# Patient Record
Sex: MALE | Race: WHITE | NOT HISPANIC OR LATINO
[De-identification: names, ages, dates, MRNs, and addresses within clinical notes are randomized per-mention and may not be internally consistent; named-entity substitution may affect disease eponyms.]

---

## 2022-04-06 ENCOUNTER — APPOINTMENT (OUTPATIENT)
Dept: ORTHOPEDIC SURGERY | Facility: CLINIC | Age: 67
End: 2022-04-06
Payer: MEDICARE

## 2022-04-06 VITALS — BODY MASS INDEX: 23.3 KG/M2 | WEIGHT: 145 LBS | HEIGHT: 66 IN

## 2022-04-06 PROBLEM — Z00.00 ENCOUNTER FOR PREVENTIVE HEALTH EXAMINATION: Status: ACTIVE | Noted: 2022-04-06

## 2022-04-06 PROCEDURE — J3490M: CUSTOM

## 2022-04-06 PROCEDURE — 99203 OFFICE O/P NEW LOW 30 MIN: CPT | Mod: 25

## 2022-04-06 PROCEDURE — 20611 DRAIN/INJ JOINT/BURSA W/US: CPT

## 2022-04-06 PROCEDURE — 73564 X-RAY EXAM KNEE 4 OR MORE: CPT | Mod: LT

## 2022-04-06 RX ORDER — AMLODIPINE BESYLATE 5 MG/1
5 TABLET ORAL
Refills: 0 | Status: ACTIVE | COMMUNITY

## 2022-04-06 RX ORDER — LOSARTAN POTASSIUM 100 MG/1
TABLET, FILM COATED ORAL
Refills: 0 | Status: ACTIVE | COMMUNITY

## 2022-04-06 RX ORDER — ATENOLOL 25 MG/1
25 TABLET ORAL
Refills: 0 | Status: ACTIVE | COMMUNITY

## 2022-04-06 NOTE — HISTORY OF PRESENT ILLNESS
[Gradual] : gradual [6] : 6 [2] : 2 [Sharp] : sharp [Throbbing] : throbbing [Meds] : meds [Left Leg] : left leg [de-identified] : 66 year old male with pain in the left knee, symptoms started a couple weeks ago, knee feels stiff, pain with stairs and kneeling. No mechanical symptoms. He denies any prior history of injury.  [] : no [FreeTextEntry1] : left knee [FreeTextEntry5] : pt has been having left knee pain for the past few weeks. pt feels pain with bending and squeezing of the knee.  [FreeTextEntry9] : naproxen [de-identified] : bending or walking

## 2022-04-06 NOTE — DISCUSSION/SUMMARY
[Medication Risks Reviewed] : Medication risks reviewed [de-identified] : Patient allowed to gently start resuming activities.\par Discussed change to medication prescription and usage. \par Offered cortisone steroid injection. \par Bracing options discussed with patient. \par Hyaluronic Acid inj pamphlet given to pt.\par

## 2022-04-06 NOTE — PHYSICAL EXAM
[5___] : hamstring 5[unfilled]/5 [Equivocal] : equivocal Reyna [Left] : left knee [AP] : anteroposterior [Lateral] : lateral [[place]] : skyline [AP Standing] : anteroposterior standing [Moderate tricompartmental OA medial narrowing] : Moderate tricompartmental OA medial narrowing [] : non-antalgic

## 2022-06-08 ENCOUNTER — APPOINTMENT (OUTPATIENT)
Dept: ORTHOPEDIC SURGERY | Facility: CLINIC | Age: 67
End: 2022-06-08
Payer: MEDICARE

## 2022-06-08 VITALS — BODY MASS INDEX: 22.98 KG/M2 | HEIGHT: 66 IN | WEIGHT: 143 LBS

## 2022-06-08 PROCEDURE — 99213 OFFICE O/P EST LOW 20 MIN: CPT | Mod: 25

## 2022-06-08 PROCEDURE — 20611 DRAIN/INJ JOINT/BURSA W/US: CPT

## 2022-06-08 NOTE — DISCUSSION/SUMMARY
[de-identified] : Patient allowed to gently start resuming activities.\par Discussed change to medication prescription and usage. \par Bracing options discussed with patient. \par Hyaluronic Acid inj pamphlet given to pt.\par Name of Insurance\par Insurance Address:\par \par 06/08/2022 \par \par  \par \par RE:  GINA HUERTAS \par \par Acct #- [id number] \par \par \par Attention:  Nurse Reviewer /Medical Director\par \par I am writing this letter as a medical necessity for PT program.\par Patient has tried analgesics, non-steroid anti-inflammatory agents, \par hot or cold compresses,injections of corticosteroids, etc)  which in combination or by themselves has not worked.\par Based on my patient's condition, I strongly believe that the PT is medically needed.\par  \par Thank you for your time and consideration.   \par  \par \par \par \par \par

## 2022-06-08 NOTE — HISTORY OF PRESENT ILLNESS
[8] : 8 [4] : 4 [de-identified] : pt is here today for a follow up of the left knee. pt states the cortisone injection helped for a little but the pain is now back  [FreeTextEntry1] : left knee  [de-identified] : none

## 2022-06-15 ENCOUNTER — APPOINTMENT (OUTPATIENT)
Dept: ORTHOPEDIC SURGERY | Facility: CLINIC | Age: 67
End: 2022-06-15
Payer: MEDICARE

## 2022-06-15 VITALS — BODY MASS INDEX: 22.98 KG/M2 | HEIGHT: 66 IN | WEIGHT: 143 LBS

## 2022-06-15 PROCEDURE — 99024 POSTOP FOLLOW-UP VISIT: CPT

## 2022-06-15 PROCEDURE — 20611 DRAIN/INJ JOINT/BURSA W/US: CPT

## 2022-06-15 NOTE — PROCEDURE
[FreeTextEntry3] : Orthovisc (Large Joint) with Ultrasound Guidance\par Viscosupplementation Injection: X-ray evidence of Osteoarthritis on this or prior visit and Patient has tried OTC's including aspirin, Ibuprofen, Aleve etc or prescription NSAIDS, and/or exercises at home and/ or physical therapy without satisfactory response. \par An injection of Orthovisc 2ml #__2__ was injected into the left knee(s). after verbal consent using sterile technique. The risks, benefits, and alternatives to Viscosupplementation injection were explained in full to the patient. Risks outlined include but are not limited to infection, sepsis, bleeding, scarring, skin discoloration, temporary increase in pain, syncopal episode, failure to resolve symptoms, allergic reaction, and symptom recurrence. Signs and symptoms of infection reviewed and patient advised to call immediately for redness, fevers, and/or chills. Patient understood the risks. All questions were answered. After discussion of options, patient requested Viscosupplementation. Oral informed consent was obtained and sterile prep was done of the injection site. Sterile technique was used without complications. The patient tolerated the procedure well. Ice tonight to the injection site. \par \par Ultrasound Guidance was used for the following reasons: altered anatomic landmarks because of erosive arthritis. \par \par Ultrasound guided injection was performed of the knee, visualization of the needle and placement of injection was performed without complication. \par

## 2022-06-15 NOTE — HISTORY OF PRESENT ILLNESS
[2] : 2 [Orthovisc] : Orthovisc [de-identified] : Patient returns for Orthovisc #2 left knee, tolerated the first injection well, no adverse reactions.  [] : no [de-identified] : 06/08/2022 [de-identified] : left knee

## 2022-06-22 ENCOUNTER — APPOINTMENT (OUTPATIENT)
Dept: ORTHOPEDIC SURGERY | Facility: CLINIC | Age: 67
End: 2022-06-22

## 2022-06-22 VITALS — WEIGHT: 143 LBS | BODY MASS INDEX: 22.98 KG/M2 | HEIGHT: 66 IN

## 2022-06-22 PROCEDURE — 99024 POSTOP FOLLOW-UP VISIT: CPT | Mod: NC

## 2022-06-22 PROCEDURE — 20611 DRAIN/INJ JOINT/BURSA W/US: CPT

## 2022-06-22 RX ORDER — ATENOLOL 100 MG/1
100 TABLET ORAL
Qty: 30 | Refills: 0 | Status: ACTIVE | COMMUNITY
Start: 2022-06-14

## 2022-06-22 RX ORDER — AMLODIPINE BESYLATE 10 MG/1
10 TABLET ORAL
Qty: 30 | Refills: 0 | Status: ACTIVE | COMMUNITY
Start: 2022-06-20

## 2022-06-22 RX ORDER — OLMESARTAN MEDOXOMIL AND HYDROCHLOROTHIAZIDE 40; 25 MG/1; MG/1
40-25 TABLET ORAL
Qty: 30 | Refills: 0 | Status: ACTIVE | COMMUNITY
Start: 2022-06-20

## 2022-06-22 RX ORDER — DOXAZOSIN 8 MG/1
8 TABLET ORAL
Qty: 30 | Refills: 0 | Status: ACTIVE | COMMUNITY
Start: 2022-06-14

## 2022-06-22 NOTE — HISTORY OF PRESENT ILLNESS
[3] : 3 [Orthovisc] : Orthovisc [de-identified] : Patient returns for Orthovisc #3 left knee, tolerating the series well  [] : no [de-identified] : 06/15/2022 [de-identified] : left knee

## 2022-06-22 NOTE — PROCEDURE
[FreeTextEntry3] : Orthovisc (Large Joint) with Ultrasound Guidance\par Viscosupplementation Injection: X-ray evidence of Osteoarthritis on this or prior visit and Patient has tried OTC's including aspirin, Ibuprofen, Aleve etc or prescription NSAIDS, and/or exercises at home and/ or physical therapy without satisfactory response. \par An injection of Orthovisc 2ml #__3__ was injected into the left knee(s). after verbal consent using sterile technique. The risks, benefits, and alternatives to Viscosupplementation injection were explained in full to the patient. Risks outlined include but are not limited to infection, sepsis, bleeding, scarring, skin discoloration, temporary increase in pain, syncopal episode, failure to resolve symptoms, allergic reaction, and symptom recurrence. Signs and symptoms of infection reviewed and patient advised to call immediately for redness, fevers, and/or chills. Patient understood the risks. All questions were answered. After discussion of options, patient requested Viscosupplementation. Oral informed consent was obtained and sterile prep was done of the injection site. Sterile technique was used without complications. The patient tolerated the procedure well. Ice tonight to the injection site. \par \par Ultrasound Guidance was used for the following reasons: altered anatomic landmarks because of erosive arthritis. \par \par Ultrasound guided injection was performed of the knee, visualization of the needle and placement of injection was performed without complication. \par

## 2022-06-29 ENCOUNTER — APPOINTMENT (OUTPATIENT)
Dept: ORTHOPEDIC SURGERY | Facility: CLINIC | Age: 67
End: 2022-06-29

## 2022-06-29 VITALS — HEIGHT: 66 IN | BODY MASS INDEX: 22.98 KG/M2 | WEIGHT: 143 LBS

## 2022-06-29 PROCEDURE — 99024 POSTOP FOLLOW-UP VISIT: CPT

## 2022-06-29 PROCEDURE — 20611 DRAIN/INJ JOINT/BURSA W/US: CPT | Mod: LT

## 2022-06-29 NOTE — PROCEDURE
[FreeTextEntry3] : Orthovisc (Large Joint) with Ultrasound Guidance\par Viscosupplementation Injection: X-ray evidence of Osteoarthritis on this or prior visit and Patient has tried OTC's including aspirin, Ibuprofen, Aleve etc or prescription NSAIDS, and/or exercises at home and/ or physical therapy without satisfactory response. \par An injection of Orthovisc 2ml #__4__ was injected into the left knee(s). after verbal consent using sterile technique. The risks, benefits, and alternatives to Viscosupplementation injection were explained in full to the patient. Risks outlined include but are not limited to infection, sepsis, bleeding, scarring, skin discoloration, temporary increase in pain, syncopal episode, failure to resolve symptoms, allergic reaction, and symptom recurrence. Signs and symptoms of infection reviewed and patient advised to call immediately for redness, fevers, and/or chills. Patient understood the risks. All questions were answered. After discussion of options, patient requested Viscosupplementation. Oral informed consent was obtained and sterile prep was done of the injection site. Sterile technique was used without complications. The patient tolerated the procedure well. Ice tonight to the injection site. \par \par Ultrasound Guidance was used for the following reasons: altered anatomic landmarks because of erosive arthritis. \par \par Ultrasound guided injection was performed of the knee, visualization of the needle and placement of injection was performed without complication. \par

## 2022-06-29 NOTE — HISTORY OF PRESENT ILLNESS
[Orthovisc] : Orthovisc [de-identified] : Patient returns for Orthovisc #4 left knee, tolerating the series well  [3] : 3 [] : no [de-identified] : 06/15/2022 [de-identified] : left knee

## 2023-08-14 ENCOUNTER — APPOINTMENT (OUTPATIENT)
Dept: ORTHOPEDIC SURGERY | Facility: CLINIC | Age: 68
End: 2023-08-14
Payer: MEDICARE

## 2023-08-14 VITALS — BODY MASS INDEX: 22.98 KG/M2 | WEIGHT: 143 LBS | HEIGHT: 66 IN

## 2023-08-14 PROCEDURE — 99213 OFFICE O/P EST LOW 20 MIN: CPT | Mod: 25

## 2023-08-14 PROCEDURE — J3490M: CUSTOM | Mod: NC

## 2023-08-14 PROCEDURE — 73564 X-RAY EXAM KNEE 4 OR MORE: CPT | Mod: LT

## 2023-08-14 PROCEDURE — 20611 DRAIN/INJ JOINT/BURSA W/US: CPT | Mod: LT

## 2023-08-14 NOTE — PROCEDURE
[FreeTextEntry3] : Large Joint Injection / Aspiration: Lidocaine, Zilretta Ultrasound and Guidance Lidocaine: 3 cc. Needle size: 18 gauge , 1.5 inch.  Zilretta: An Injection of Zilretta 32 Units 5ml ,  was injected into the left knee  Ultrasound Guidance was used  Ultrasound guided injection was performed of the knee, visualization of the needle and placement of injection was performed without complication.   Large Joint Injection was performed because of pain and inflammation. Anesthesia: ethyl chloride sprayed topically.. Patient has tried OTC's including aspirin, Ibuprofen, Aleve etc or prescription NSAIDS, and/or exercises at home and/ or physical therapy without satisfactory response. After verbal consent using sterile preparation and technique. The risks, benefits, and alternatives to aspiration were explained in full to the patient. Risks outlined include but are not limited to infection, sepsis, bleeding, scarring, skin discoloration, temporary increase in pain, syncopal episode, failure to resolve symptoms, allergic reaction and symptom recurrence. Patient understood the risks. All questions were answered. After discussion of options, patient requested an aspiration. Oral informed consent was obtained and sterile prep was done of the injection site. Sterile technique was utilized for the procedure including the preparation of the solutions used for the aspiration. Patient tolerated the procedure well. Advised to ice the injection site this evening. Prep with betadine locally to site. Sterile technique used. Patient tolerated procedure well. Post Procedure Instructions: Patient was advised to call if redness, pain, or fever occur and apply ice for 15 min. out of every hour for the next 12-24 hours as tolerated. patient was advised to rest the joint(s) for 1 days.

## 2023-08-14 NOTE — HISTORY OF PRESENT ILLNESS
[10] : 10 [4] : 4 [de-identified] : Patient completedr Orthovisc #4 June 29 left knee,did well, no injury, csi has helped before [3] : 3 [Orthovisc] : Orthovisc [] : yes [FreeTextEntry1] : left knee [de-identified] : 06/15/2022 [de-identified] : left knee

## 2023-08-14 NOTE — PHYSICAL EXAM
[5___] : quadriceps 5[unfilled]/5 [Negative] : negative Cookie's [] : mildly antalgic [Left] : left knee [All Views] : anteroposterior, lateral, skyline, and anteroposterior standing [Moderate tricompartmental OA medial narrowing] : Moderate tricompartmental OA medial narrowing [FreeTextEntry9] : unchanged [TWNoteComboBox7] : flexion 135 degrees

## 2024-01-04 ENCOUNTER — APPOINTMENT (OUTPATIENT)
Dept: ORTHOPEDIC SURGERY | Facility: CLINIC | Age: 69
End: 2024-01-04
Payer: MEDICARE

## 2024-01-04 VITALS — HEIGHT: 66 IN | BODY MASS INDEX: 22.98 KG/M2 | WEIGHT: 143 LBS

## 2024-01-04 PROCEDURE — 20611 DRAIN/INJ JOINT/BURSA W/US: CPT | Mod: LT

## 2024-01-04 PROCEDURE — 99213 OFFICE O/P EST LOW 20 MIN: CPT | Mod: 25

## 2024-01-04 NOTE — HISTORY OF PRESENT ILLNESS
[Gradual] : gradual [5] : 5 [4] : 4 [Dull/Aching] : dull/aching [Intermittent] : intermittent [Sleep] : sleep [Rest] : rest [Meds] : meds [Ice] : ice [Heat] : heat [de-identified] : Patient completed Orthovisc #4 left knee June 29 which helped, worse when more active, no swelling, no injury [3] : 3 [Orthovisc] : Orthovisc [] : yes [FreeTextEntry1] : left knee  [FreeTextEntry9] : Susan  [de-identified] : 06/15/2022 [de-identified] : left knee

## 2024-01-04 NOTE — ASSESSMENT
[FreeTextEntry1] : Patient allowed to gently start resuming activities. Discussed change to medication prescription and usage.  Bracing options discussed with patient. Hyaluronic Acid inj pamphlet given to pt. try topical lidocaine reviewed current medications being used by this patient 01/04/2024  RE:  GINA HUERTAS   Acct #- [id number]  Attention:  Nurse Reviewer /Medical Director  I am writing this letter as a medical necessity for HA euflexxa Patient has tried analgesics, non-steroid anti-inflammatory agents,  physical therapy, hot or cold compresses,injections of corticosteroids, etc)  which in combination or by themselves has not worked. Based on my patient's condition, I strongly believe that the Hyaluronic aid injections is medically needed.   Thank you for your time and consideration.

## 2024-01-04 NOTE — PROCEDURE
[FreeTextEntry3] : Euflexxa (Large Joint) with Ultrasound Guidance Viscosupplementation Injection: X-ray evidence of Osteoarthritis on this or prior visit and Patient has tried OTC's including aspirin, Ibuprofen, Aleve etc or prescription NSAIDS, and/or exercises at home and/ or physical therapy without satisfactory response.  An injection of Euflexxa 2ml _#_1__ was injected into the left knee(s). after verbal consent using sterile technique. The risks, benefits, and alternatives to Viscosupplementation injection were explained in full to the patient. Risks outlined include but are not limited to infection, sepsis, bleeding, scarring, skin discoloration, temporary increase in pain, syncopal episode, failure to resolve symptoms, allergic reaction, and symptom recurrence. Signs and symptoms of infection reviewed and patient advised to call immediately for redness, fevers, and/or chills. Patient understood the risks. All questions were answered. After discussion of options, patient requested Viscosupplementation. Oral informed consent was obtained and sterile prep was done of the injection site. Sterile technique was used without complications. The patient tolerated the procedure well. Ice tonight to the injection site.   Ultrasound Guidance was used for the following reasons: altered anatomic landmarks because of erosive arthritis.   Ultrasound guided injection was performed of the knee, visualization of the needle and placement of injection was performed without complication.

## 2024-01-11 ENCOUNTER — APPOINTMENT (OUTPATIENT)
Dept: ORTHOPEDIC SURGERY | Facility: CLINIC | Age: 69
End: 2024-01-11
Payer: MEDICARE

## 2024-01-11 DIAGNOSIS — M70.52 OTHER BURSITIS OF KNEE, LEFT KNEE: ICD-10-CM

## 2024-01-11 PROCEDURE — 20611 DRAIN/INJ JOINT/BURSA W/US: CPT | Mod: LT

## 2024-01-11 NOTE — PROCEDURE
[FreeTextEntry3] : Euflexxa (Large Joint) with Ultrasound Guidance Viscosupplementation Injection: X-ray evidence of Osteoarthritis on this or prior visit and Patient has tried OTC's including aspirin, Ibuprofen, Aleve etc or prescription NSAIDS, and/or exercises at home and/ or physical therapy without satisfactory response.  An injection of Euflexxa 2ml _#_2__ was injected into the left knee(s). after verbal consent using sterile technique. The risks, benefits, and alternatives to Viscosupplementation injection were explained in full to the patient. Risks outlined include but are not limited to infection, sepsis, bleeding, scarring, skin discoloration, temporary increase in pain, syncopal episode, failure to resolve symptoms, allergic reaction, and symptom recurrence. Signs and symptoms of infection reviewed and patient advised to call immediately for redness, fevers, and/or chills. Patient understood the risks. All questions were answered. After discussion of options, patient requested Viscosupplementation. Oral informed consent was obtained and sterile prep was done of the injection site. Sterile technique was used without complications. The patient tolerated the procedure well. Ice tonight to the injection site.   Ultrasound Guidance was used for the following reasons: altered anatomic landmarks because of erosive arthritis.   Ultrasound guided injection was performed of the knee, visualization of the needle and placement of injection was performed without complication.

## 2024-01-18 ENCOUNTER — APPOINTMENT (OUTPATIENT)
Dept: ORTHOPEDIC SURGERY | Facility: CLINIC | Age: 69
End: 2024-01-18
Payer: MEDICARE

## 2024-01-18 VITALS — WEIGHT: 143 LBS | HEIGHT: 66 IN | BODY MASS INDEX: 22.98 KG/M2

## 2024-01-18 PROCEDURE — 20611 DRAIN/INJ JOINT/BURSA W/US: CPT | Mod: LT

## 2024-01-18 NOTE — PROCEDURE
[FreeTextEntry3] : Euflexxa (Large Joint) with Ultrasound Guidance Viscosupplementation Injection: X-ray evidence of Osteoarthritis on this or prior visit and Patient has tried OTC's including aspirin, Ibuprofen, Aleve etc or prescription NSAIDS, and/or exercises at home and/ or physical therapy without satisfactory response.  An injection of Euflexxa 2ml _#_3__ was injected into the left knee(s). after verbal consent using sterile technique. The risks, benefits, and alternatives to Viscosupplementation injection were explained in full to the patient. Risks outlined include but are not limited to infection, sepsis, bleeding, scarring, skin discoloration, temporary increase in pain, syncopal episode, failure to resolve symptoms, allergic reaction, and symptom recurrence. Signs and symptoms of infection reviewed and patient advised to call immediately for redness, fevers, and/or chills. Patient understood the risks. All questions were answered. After discussion of options, patient requested Viscosupplementation. Oral informed consent was obtained and sterile prep was done of the injection site. Sterile technique was used without complications. The patient tolerated the procedure well. Ice tonight to the injection site.   Ultrasound Guidance was used for the following reasons: altered anatomic landmarks because of erosive arthritis.   Ultrasound guided injection was performed of the knee, visualization of the needle and placement of injection was performed without complication.

## 2024-01-18 NOTE — HISTORY OF PRESENT ILLNESS
[3] : 3 [Euflexxa] : Euflexxa [de-identified] : Patient returns for Euflexxa #3 left knee.  Has OA [] : no [FreeTextEntry1] : left knee  [de-identified] : 1/11/24 [de-identified] : left knee

## 2024-05-02 ENCOUNTER — APPOINTMENT (OUTPATIENT)
Dept: ORTHOPEDIC SURGERY | Facility: CLINIC | Age: 69
End: 2024-05-02
Payer: MEDICARE

## 2024-05-02 VITALS — HEIGHT: 66 IN | BODY MASS INDEX: 22.98 KG/M2 | WEIGHT: 143 LBS

## 2024-05-02 DIAGNOSIS — M17.12 UNILATERAL PRIMARY OSTEOARTHRITIS, LEFT KNEE: ICD-10-CM

## 2024-05-02 PROCEDURE — 20611 DRAIN/INJ JOINT/BURSA W/US: CPT | Mod: LT

## 2024-05-02 PROCEDURE — 99213 OFFICE O/P EST LOW 20 MIN: CPT | Mod: 25

## 2024-05-02 NOTE — HISTORY OF PRESENT ILLNESS
[Gradual] : gradual [6] : 6 [5] : 5 [Dull/Aching] : dull/aching [Throbbing] : throbbing [Intermittent] : intermittent [Rest] : rest [Meds] : meds [Walking] : walking [Stairs] : stairs [de-identified] : Patient returns for Euflexxa #3 left knee Jan 18.  Has OA, uses meds on occasion, worse when more active [3] : 3 [Euflexxa] : Euflexxa [] : yes [FreeTextEntry1] : left knee  [de-identified] : 1/11/24 [de-identified] : left knee

## 2024-10-03 ENCOUNTER — APPOINTMENT (OUTPATIENT)
Dept: ORTHOPEDIC SURGERY | Facility: CLINIC | Age: 69
End: 2024-10-03

## 2024-10-16 ENCOUNTER — APPOINTMENT (OUTPATIENT)
Dept: ORTHOPEDIC SURGERY | Facility: CLINIC | Age: 69
End: 2024-10-16
Payer: MEDICARE

## 2024-10-16 VITALS — BODY MASS INDEX: 22.98 KG/M2 | HEIGHT: 66 IN | WEIGHT: 143 LBS

## 2024-10-16 DIAGNOSIS — M17.12 UNILATERAL PRIMARY OSTEOARTHRITIS, LEFT KNEE: ICD-10-CM

## 2024-10-16 PROCEDURE — 20611 DRAIN/INJ JOINT/BURSA W/US: CPT | Mod: LT

## 2024-10-16 PROCEDURE — 99213 OFFICE O/P EST LOW 20 MIN: CPT | Mod: 25

## 2024-10-16 PROCEDURE — 73564 X-RAY EXAM KNEE 4 OR MORE: CPT | Mod: LT

## 2025-02-14 ENCOUNTER — APPOINTMENT (OUTPATIENT)
Dept: ORTHOPEDIC SURGERY | Facility: CLINIC | Age: 70
End: 2025-02-14

## 2025-02-24 ENCOUNTER — APPOINTMENT (OUTPATIENT)
Dept: ORTHOPEDIC SURGERY | Facility: CLINIC | Age: 70
End: 2025-02-24
Payer: MEDICARE

## 2025-02-24 VITALS — HEIGHT: 66 IN | WEIGHT: 143 LBS | BODY MASS INDEX: 22.98 KG/M2

## 2025-02-24 DIAGNOSIS — M17.12 UNILATERAL PRIMARY OSTEOARTHRITIS, LEFT KNEE: ICD-10-CM

## 2025-02-24 PROCEDURE — 99214 OFFICE O/P EST MOD 30 MIN: CPT

## 2025-02-24 RX ORDER — MELOXICAM 15 MG/1
15 TABLET ORAL
Qty: 21 | Refills: 1 | Status: ACTIVE | COMMUNITY
Start: 2025-02-24 | End: 1900-01-01

## 2025-03-10 ENCOUNTER — APPOINTMENT (OUTPATIENT)
Dept: ORTHOPEDIC SURGERY | Facility: CLINIC | Age: 70
End: 2025-03-10
Payer: MEDICARE

## 2025-03-10 VITALS — HEIGHT: 66 IN | WEIGHT: 143 LBS | BODY MASS INDEX: 22.98 KG/M2

## 2025-03-10 DIAGNOSIS — M70.52 OTHER BURSITIS OF KNEE, LEFT KNEE: ICD-10-CM

## 2025-03-10 DIAGNOSIS — M17.12 UNILATERAL PRIMARY OSTEOARTHRITIS, LEFT KNEE: ICD-10-CM

## 2025-03-10 PROCEDURE — 99213 OFFICE O/P EST LOW 20 MIN: CPT | Mod: 25

## 2025-03-10 PROCEDURE — 20611 DRAIN/INJ JOINT/BURSA W/US: CPT | Mod: LT

## 2025-08-27 ENCOUNTER — APPOINTMENT (OUTPATIENT)
Dept: ORTHOPEDIC SURGERY | Facility: CLINIC | Age: 70
End: 2025-08-27

## 2025-08-27 DIAGNOSIS — M17.12 UNILATERAL PRIMARY OSTEOARTHRITIS, LEFT KNEE: ICD-10-CM

## 2025-08-27 PROCEDURE — 20611 DRAIN/INJ JOINT/BURSA W/US: CPT | Mod: LT

## 2025-08-27 PROCEDURE — 99203 OFFICE O/P NEW LOW 30 MIN: CPT | Mod: 25
